# Patient Record
Sex: FEMALE
[De-identification: names, ages, dates, MRNs, and addresses within clinical notes are randomized per-mention and may not be internally consistent; named-entity substitution may affect disease eponyms.]

---

## 2018-11-30 LAB
ANION GAP SERPL CALC-SCNC: 10.9 MMOL/L (ref 8–16)
BASOPHILS # BLD AUTO: 0 10*3/UL (ref 0–0.1)
BASOPHILS NFR BLD AUTO: 0.4 % (ref 0–1)
BUN SERPL-MCNC: 13 MG/DL (ref 7–26)
BUN/CREAT SERPL: 14 (ref 6–25)
CALCIUM SERPL-MCNC: 9 MG/DL (ref 8.4–10.2)
CHLORIDE SERPL-SCNC: 106 MMOL/L (ref 98–107)
CO2 SERPL-SCNC: 27 MMOL/L (ref 22–29)
DEPRECATED NEUTROPHILS # BLD AUTO: 4.6 10*3/UL (ref 2.1–6.9)
EGFRCR SERPLBLD CKD-EPI 2021: > 60 ML/MIN (ref 60–?)
EOSINOPHIL # BLD AUTO: 0.2 10*3/UL (ref 0–0.4)
EOSINOPHIL NFR BLD AUTO: 2.5 % (ref 0–6)
ERYTHROCYTE [DISTWIDTH] IN CORD BLOOD: 12.7 % (ref 11.7–14.4)
GLUCOSE SERPLBLD-MCNC: 115 MG/DL (ref 74–118)
HCT VFR BLD AUTO: 38.4 % (ref 34.2–44.1)
HGB BLD-MCNC: 13 G/DL (ref 12–16)
LYMPHOCYTES # BLD: 3.7 10*3/UL (ref 1–3.2)
LYMPHOCYTES NFR BLD AUTO: 40.2 % (ref 18–39.1)
MCH RBC QN AUTO: 29.4 PG (ref 28–32)
MCHC RBC AUTO-ENTMCNC: 33.9 G/DL (ref 31–35)
MCV RBC AUTO: 86.9 FL (ref 81–99)
MONOCYTES # BLD AUTO: 0.6 10*3/UL (ref 0.2–0.8)
MONOCYTES NFR BLD AUTO: 6.8 % (ref 4.4–11.3)
NEUTS SEG NFR BLD AUTO: 49.7 % (ref 38.7–80)
PLATELET # BLD AUTO: 290 X10E3/UL (ref 140–360)
POTASSIUM SERPL-SCNC: 3.9 MMOL/L (ref 3.5–5.1)
RBC # BLD AUTO: 4.42 X10E6/UL (ref 3.6–5.1)
SODIUM SERPL-SCNC: 140 MMOL/L (ref 136–145)

## 2018-11-30 NOTE — DIAGNOSTIC IMAGING REPORT
EXAMINATION:  CHEST 2 VIEWS    



INDICATION:      

^MD ORDER

^PRE ADMIT    



COMPARISON:  None

     

FINDINGS:  PA and lateral views



TUBES and LINES:  None.



LUNGS:  Lungs are well inflated.  Lungs are clear.   There is no evidence of

pneumonia or pulmonary edema.



PLEURA:  No pleural effusion or pneumothorax.



HEART AND MEDIASTINUM:  The cardiomediastinal silhouette is unremarkable.



BONES AND SOFT TISSUES:  No acute osseous lesion.  Soft tissues are

unremarkable.



UPPER ABDOMEN: No free air under the diaphragm. Right upper quadrant

cholecystectomy clips.



IMPRESSION: 

No acute thoracic abnormality.





Signed by: Dr. Linda Torres M.D. on 11/30/2018 4:21 PM

## 2018-12-04 ENCOUNTER — HOSPITAL ENCOUNTER (OUTPATIENT)
Dept: HOSPITAL 88 - OR | Age: 58
Discharge: HOME | End: 2018-12-04
Attending: PODIATRIST
Payer: COMMERCIAL

## 2018-12-04 VITALS — SYSTOLIC BLOOD PRESSURE: 125 MMHG | DIASTOLIC BLOOD PRESSURE: 64 MMHG

## 2018-12-04 DIAGNOSIS — Z01.810: ICD-10-CM

## 2018-12-04 DIAGNOSIS — M21.622: ICD-10-CM

## 2018-12-04 DIAGNOSIS — M54.2: ICD-10-CM

## 2018-12-04 DIAGNOSIS — M20.42: ICD-10-CM

## 2018-12-04 DIAGNOSIS — Z01.812: ICD-10-CM

## 2018-12-04 DIAGNOSIS — Z01.818: ICD-10-CM

## 2018-12-04 DIAGNOSIS — M20.12: Primary | ICD-10-CM

## 2018-12-04 DIAGNOSIS — M54.9: ICD-10-CM

## 2018-12-04 PROCEDURE — 28285 REPAIR OF HAMMERTOE: CPT

## 2018-12-04 PROCEDURE — 85025 COMPLETE CBC W/AUTO DIFF WBC: CPT

## 2018-12-04 PROCEDURE — 73620 X-RAY EXAM OF FOOT: CPT

## 2018-12-04 PROCEDURE — 80048 BASIC METABOLIC PNL TOTAL CA: CPT

## 2018-12-04 PROCEDURE — 71046 X-RAY EXAM CHEST 2 VIEWS: CPT

## 2018-12-04 PROCEDURE — 28110 PART REMOVAL OF METATARSAL: CPT

## 2018-12-04 PROCEDURE — 28296 COR HLX VLGS DSTL MTAR OSTEO: CPT

## 2018-12-04 PROCEDURE — 93005 ELECTROCARDIOGRAM TRACING: CPT

## 2018-12-04 PROCEDURE — 36415 COLL VENOUS BLD VENIPUNCTURE: CPT

## 2018-12-04 NOTE — XMS REPORT
Clinical Summary

                             Created on: 2018



Kimberly Busby

External Reference #: [id number]

: 1960

Sex: Female



Demographics







                          Address                   [address]

[place]  [address]

 

                          Home Phone                [phone]

 

                          Preferred Language        Unknown

 

                          Marital Status            

 

                          Mandaen Affiliation     Unknown

 

                          Race                      White

 

                          Ethnic Group              /Latin





Author







                          Author                    [facility]

 

                          Organization              [facility]

 

                          Address                   Unknown

 

                          Phone                     Unavailable







Support







                Name            Relationship    Address         Phone

 

                    Nina Busby      [employer]                [address]

[place]  [address]                      [phone]







Care Team Providers







                    Care Team Member Name    Role                Phone

 

                    Asked, No Pcp       PCP                 Unavailable







Allergies

No Known Allergies



Medications







                          End Date                  Status



              Medication     Sig          Dispensed     Refills      Start  



                                         Date  

 

                                                    Active



                 ergocalciferol (VITAMIN     Take 50,000      0                 



                     D2) 50,000 unit capsule     Units by            8  



                                         mouth once a     



                                         week.     

 

                                                    Active



                 pantoprazole (PROTONIX)     TK 1 T PO QD      0                 



                           40 MG EC tablet           8  

 

                          2018                



              cyclobenzaprine     Take 1 tablet     20 tablet     0              



                     (FLEXERIL) 10 mg tablet     (10 mg total)       8  



                                         by mouth 2     



                                         (two) times a     



                                         day as needed     



                                         for muscle     



                                         spasms for up     



                                         to 30 days.     

 

                          2018                



              acetaminophen-codeine     Take 1-2     15 tablet     0              



                     (TYLENOL WITH CODEINE #3)     tablets by          8  



                           300-30 mg per tablet      mouth every 6     



                                         (six) hours     



                                         as needed for     



                                         moderate pain     



                                         for up to 5     



                                         days.     

 

                          2018                



              lidocaine (LIDODERM) 5 %     Place 1 patch     30 patch     0              



                           on the skin               8  



                                         daily for 30     



                                         days. Remove     



                                         & Discard     



                                         patch within     



                                         12 hours or     



                                         as directed     



                                         by MD     







Active Problems





Not on file



Encounters







                          Care Team                 Description



                     Date                Type                Specialty  

 

                                        



Mat Nelson MD           Musculoskeletal neck pain (Primary Dx)



                     2018          Emergency           Emergency Medicine  



after 2017



Social History







                                        Date



                 Tobacco Use     Types           Packs/Day       Years Used 

 

                                         



                                         Never Smoker    

 

    



                                         Smokeless Tobacco: Never   



                                         Used   









   



                 Alcohol Use     Drinks/Week     oz/Week         Comments

 

   



                                         No   









 



                           Sex Assigned at Birth     Date Recorded

 

 



                                         Not on file 









                                        Industry



                           Job Start Date            Occupation 

 

                                        Not on file



                           Not on file               Not on file 









                                        Travel End



                           Travel History            Travel Start 

 





                                         No recent travel history available.







Last Filed Vital Signs







                                        Time Taken



                           Vital Sign                Reading 

 

                                        2018 12:58 PM CDT



                           Blood Pressure            134/60 

 

                                        2018 12:58 PM CDT



                           Pulse                     60 

 

                                        2018 12:58 PM CDT



                           Temperature               36.7 C (98 F) 

 

                                        2018 12:58 PM CDT



                           Respiratory Rate          18 

 

                                        2018 12:58 PM CDT



                           Oxygen Saturation         98% 

 

                                        -



                           Inhaled Oxygen            - 



                                         Concentration  

 

                                        -



                           Weight                    - 

 

                                        2018 12:05 PM CDT



                           Height                    154.9 cm (5' 1") 

 

                                        -



                           Body Mass Index           - 







Plan of Treatment







   



                 Health Maintenance     Due Date        Last Done       Comments

 

   



                           CERVICAL CANCER SCREENING     1979  

 

   



                           BREAST CANCER SCREENING     2008  

 

   



                           COLON CANCER SCREENING     2008  

 

   



                           SHINGRIX VACCINE (1 of 2)     2008  

 

   



                           ZOSTER VACCINE            2018  

 

   



                           INFLUENZA VACCINE         2018  

 

   



                     HEPATITIS B VACCINES     Aged Out            No longer eligible based



                                         on patient's age to



                                         complete this topic

 

   



                     IPV VACCINES        Aged Out            No longer eligible based



                                         on patient's age to



                                         complete this topic

 

   



                     MENINGOCOCCAL VACCINE     Aged Out            No longer eligible based



                                         on patient's age to



                                         complete this topic







Results

Not on fileafter 2017



Insurance







     



            Payer      Benefit     Subscriber ID     Type       Phone      Address



                                         Plan /    



                                         Group    

 

     



                 BCBS            BCBS            xxxxxxxxxxxx     PPO  



                                         CHOICE    



                                         PPO/SIRISHA CALLE PPO    









     



            Guarantor Name     Account     Relation to     Date of     Phone      Billing Address



                     Type                Patient             Birth  

 

     



            Kimberly Busby     Personal/F     Self       1960     [phone] [address]               ([address])              [place] [address]







Advance Directives





Patient has advance care planning documents on file. For more information, kylah florez contact:



Dean Doherty



[address] [place] [address]

## 2018-12-04 NOTE — DIAGNOSTIC IMAGING REPORT
PROCEDURE:X-RAY LEFT FOOT, TWO VIEWS

 

COMPARISON:None.

 

INDICATIONS:S/P HALLUX FX REPAIR

 

FINDINGS:

Substantially limited radiograph secondary to overlying cast and 

portable technique. Cannot assess for bony alignment and presence of 

non-displaced fracture secondary to technique. 

 

A K wire is seen transfixing the fourth toe proximal, middle, and 

distal phalanges. The phalanges are not well evaluated. There has been 

fixation of a mildly displaced first metatarsal head fracture with 

screw and wire. Hardware appears intact. 

 

CONCLUSION:

Limited examination demonstrating post surgical changes status post 

fixation of the fourth toe and first metatarsal head. 

 

 

 

Dictated by:  ELI JIMENEZ M.D. on 12/04/2018 at 9:59     

Electronically approved by:  ELI JIMENEZ M.D. on 12/04/2018 at 9:59

## 2018-12-04 NOTE — OPERATIVE REPORT
DATE OF PROCEDURE:  December 04, 2018 

 

PREOPERATIVE DIAGNOSES 

1.  Painful hallux valgus deformity, left foot.  

2.  Painful contracted hammer toe, 4th digit, left foot.  

3.  Painful contracted hammer toe, 5th digit, left foot. 

4.  Painful Tailor's bunion, left foot.  



POSTOPERATIVE DIAGNOSES 

1.  Painful hallux valgus deformity, left foot.  

2.  Painful contracted hammer toe, 4th digit, left foot.  

3.  Painful contracted hammer toe, 5th digit, left foot. 

4.  Painful Tailor's bunion, left foot.  



OPERATIVE PROCEDURES 

1.  Vladimir bunionectomy with screw fixation, left foot.  

2.  Arthroplasty, 4th digit with Amol wire fixation of the 4th.

3.  Arthroplasty, 5th digit with Amol wire fixation of the 5th.  

4.  Tailor's bunionectomy, left foot.

5.  Intraoperative use of fluoroscopy.

6.  Trigger point shot of cortisone.

7.  Application of posterior splint.  



ANESTHESIA:  General.



HEMOSTASIS:  Pneumatic thigh tourniquet at 350 mmHg.



PROCEDURE IN DETAIL:  Patient was taken into the operating room and placed 

on the operating room table in the supine position.  Following induction of 

general anesthesia by the anesthesiologist, Webril wraps were placed on the 

patient's left thigh followed by application of a left thigh tourniquet.  

The left lower extremity was then prepped and draped in the usual aseptic 

manner.  The following procedure was then performed.  



PROCEDURE #1:  Vladimir bunionectomy with screw fixation, left foot.  

Attention was directed to the dorsomedial aspect of the 1st MPJ where a 6 

cm linear incision was performed.  The incision was deepened via sharp and 

blunt dissection being care to retract vital structures and ligate 

superficial vessels as necessary.  Once the level of capsule was reached, a 

longitudinal capsulotomy was then performed exposing a dorsomedial 

exostosis of the 1st metatarsal head.  Via the use of an oscillating saw, 

dorsomedial exostosis was excised from the operation site in toto.  A 

V-osteotomy was then performed from medial to lateral.  Capital fragment 

was then transpositioned laterally upon adequate surgical and anatomical 

reduction.  Utilizing proper AO technique, a 2.0 x 18 mm cortical screw in 

conjunction with a buried 0.045 K-wire was used to achieve stability at the 

osteotomy site.  All redundant bone medially was excised via the use of an 

oscillating saw and rotating bur.  



PROCEDURES #2 AND #3:  Arthroplasty, 4th and 5th digits with K-wire 

fixation of 4th.  Attention was then directed to the above-mentioned toes 

overlying the proximal phalangeal joint where a 3 cm linear incision was 

performed.  Incision was deepened down to the joint capsule.  Transverse 

capsulotomy was then performed exposing the head of the proximal phalanxes. 

 Via the use of an oscillating saw, the proximal phalanxes were excised 

from the operation site in toto. All rough and bony edges were rasped 

smooth.  The 4th toe was still noted be contracted, so a 0.045 K-wire was 

introduced up to the metatarsophalangeal joint to achieve proper anatomical 

reduction.  



PROCEDURE #4:  Tailor's bunionectomy, left foot.  Attention was directed to 

the dorsal lateral aspect of the 5th metatarsophalangeal joint where a 3-4 

cm linear incision was performed.  Incision was deepened down to the joint 

capsule.  Longitudinal capsulotomy was then performed exposing the 

dorsolateral exostosis of the 5th metatarsal head.  Via the use of an 

oscillating saw and rotating bur, dorsolateral exostosis was excised from 

the operation site in toto, and all rough and bony edges were rasped 

smooth.  



PROCEDURE #5:  Intraoperative use of fluoroscopy was then used to make sure 

proper alignment and fixation was achieved.  Closure was then obtained 

utilizing 3-0 Vicryl, 4-0 Vicryl and 4-0 nylon for capsule, subcutaneous 

tissue and skin respectively.  



PROCEDURE #6:  Trigger point shot of cortisone was then given to the 1st 

and 4th interspace of the left foot.  Then approximately 15 mL of 0.5% 

plain Marcaine plus 5 mL of 1% Xylocaine plain were used to achieve local 

anesthesia to the above-mentioned surgical area.  Sterile dressing was 

applied.  Upon release of the thigh tourniquet, blood hyperemia was noted 

immediate to all digits of the patient's left foot.  



PROCEDURE #7:  Application of posterior splint.  A properly placed 

posterior splint was then applied keeping the foot at 90 degrees with 

respect to the leg to try to prevent any type of postop complications.  

Patient was then transferred from the OR to the recovery room with vital 

signs stable and neurovascular status intact.  No intraoperative 

complications were encountered.  Blood loss from the surgery was minimal.  

Patient to remain nonweightbearing with the aid of crutches.  Keep her foot 

elevated.  Is to apply an ice pack to the ankle joint area.  









DD:  12/04/2018 08:42

DT:  12/04/2018 09:29

Job#:  [id number]

## 2018-12-04 NOTE — XMS REPORT
Patient Summary Document

                             Created on: 2018



JOLLY JOHNS

External Reference #: [id number]

: 1960

Sex: Female



Demographics







                          Address                   [address] [place] [address]

 

                          Home Phone                [phone]

 

                          Preferred Language        Unknown

 

                          Marital Status            Unknown

 

                          Religion Affiliation     Unknown

 

                          Race                      Unknown

 

                                        Additional Race(s) 

 

 

                          Ethnic Group              Unknown





Author







                          Author                    [facility]

 

                          Address                   Unknown

 

                          Phone                     Unavailable







Care Team Providers







                    Care Team Member Name    Role                Phone

 

                    KENRICK MELTON        Unavailable         Unavailable

 

                    JOE JEROME     Unavailable         Unavailable







Problems

This patient has no known problems.



Allergies, Adverse Reactions, Alerts

This patient has no known allergies or adverse reactions.



Medications

This patient has no known medications.



Encounters







             Start Date/Time    End Date/Time    Encounter Type    Admission Type    Attending Clinicians

                    Care Facility       Care Department     Encounter ID

 

          2017-08-15 16:12:00    2017-08-15 23:59:00    Outpatient    C         JOE JEROME    [facility]                               [id number]







Results







           Test Description    Test Time    Test Comments    Text Results    Atomic Results    Result

 Comments

 

                CHEST 2 VIEWS    2018 16:17:00                                                             

                                             [place] [address]  
   Patient Name: JOLLY JOHNS                                   MR #: [id number]
                    : 1960                                   Age/Sex: 
58/F  Acct #: [id number]                              Req #: [id number]  Adm 
Physician:                                                      Ordered by: 
KENRICK MELTON DP                            Report #: [id number]        Location: 
OR                                      Room/Bed:                     
___________________________________________________________________________________________________
   Procedure: [id number] DX/CHEST 2 VIEWS  Exam Date:                            
Exam Time:                                               REPORT STATUS: Signed  
 EXAMINATION:  CHEST 2 VIEWS          INDICATION:          MD ORDER    PRE ADMIT
         COMPARISON:  None           FINDINGS:  PA and lateral views      TUBES 
and LINES:  None.      LUNGS:  Lungs are well inflated.  Lungs are clear.   
There is no evidence of   pneumonia or pulmonary edema.      PLEURA:  No pleural
effusion or pneumothorax.      HEART AND MEDIASTINUM:  The cardiomediastinal 
silhouette is unremarkable.      BONES AND SOFT TISSUES:  No acute osseous 
lesion.  Soft tissues are   unremarkable.      UPPER ABDOMEN: No free air under 
the diaphragm. Right upper quadrant   cholecystectomy clips.      IMPRESSION:   
No acute thoracic abnormality.         Signed by: Dr. Herlinda Morrissey M.D. on 2018 4:21 PM        Dictated By: HERLINDA MORRISSEY MD  
Electronically Signed By: HERLINDA MORRISSEY MD on 18 1621  
Transcribed By: NABILA on 18 1621       COPY TO:   KENRICK MELTON DPM

## 2019-02-22 ENCOUNTER — HOSPITAL ENCOUNTER (OUTPATIENT)
Dept: HOSPITAL 88 - ER | Age: 59
Setting detail: OBSERVATION
LOS: 1 days | Discharge: HOME | End: 2019-02-23
Attending: INTERNAL MEDICINE | Admitting: INTERNAL MEDICINE
Payer: COMMERCIAL

## 2019-02-22 VITALS — WEIGHT: 154.19 LBS | BODY MASS INDEX: 29.11 KG/M2 | HEIGHT: 61 IN

## 2019-02-22 VITALS — SYSTOLIC BLOOD PRESSURE: 152 MMHG | DIASTOLIC BLOOD PRESSURE: 68 MMHG

## 2019-02-22 VITALS — DIASTOLIC BLOOD PRESSURE: 68 MMHG | SYSTOLIC BLOOD PRESSURE: 152 MMHG

## 2019-02-22 DIAGNOSIS — K92.1: Primary | ICD-10-CM

## 2019-02-22 DIAGNOSIS — E11.9: ICD-10-CM

## 2019-02-22 LAB
ALBUMIN SERPL-MCNC: 4 G/DL (ref 3.5–5)
ALBUMIN/GLOB SERPL: 1.2 {RATIO} (ref 0.8–2)
ALP SERPL-CCNC: 136 IU/L (ref 40–150)
ALT SERPL-CCNC: 65 IU/L (ref 0–55)
ANION GAP SERPL CALC-SCNC: 14.7 MMOL/L (ref 8–16)
BACTERIA URNS QL MICRO: (no result) /HPF
BASOPHILS # BLD AUTO: 0 10*3/UL (ref 0–0.1)
BASOPHILS NFR BLD AUTO: 0.5 % (ref 0–1)
BILIRUB UR QL: NEGATIVE
BUN SERPL-MCNC: 9 MG/DL (ref 7–26)
BUN/CREAT SERPL: 12 (ref 6–25)
CALCIUM SERPL-MCNC: 9.5 MG/DL (ref 8.4–10.2)
CHLORIDE SERPL-SCNC: 106 MMOL/L (ref 98–107)
CLARITY UR: (no result)
CO2 SERPL-SCNC: 22 MMOL/L (ref 22–29)
COLOR UR: YELLOW
DEPRECATED INR PLAS: 0.88
DEPRECATED NEUTROPHILS # BLD AUTO: 4.5 10*3/UL (ref 2.1–6.9)
DEPRECATED RBC URNS MANUAL-ACNC: (no result) /HPF (ref 0–5)
EGFRCR SERPLBLD CKD-EPI 2021: > 60 ML/MIN (ref 60–?)
EOSINOPHIL # BLD AUTO: 0.1 10*3/UL (ref 0–0.4)
EOSINOPHIL NFR BLD AUTO: 1.2 % (ref 0–6)
EPI CELLS URNS QL MICRO: (no result) /LPF
ERYTHROCYTE [DISTWIDTH] IN CORD BLOOD: 13.2 % (ref 11.7–14.4)
GLOBULIN PLAS-MCNC: 3.3 G/DL (ref 2.3–3.5)
GLUCOSE SERPLBLD-MCNC: 123 MG/DL (ref 74–118)
HCT VFR BLD AUTO: 39.6 % (ref 34.2–44.1)
HGB BLD-MCNC: 13.3 G/DL (ref 12–16)
KETONES UR QL STRIP.AUTO: (no result)
LEUKOCYTE ESTERASE UR QL STRIP.AUTO: (no result)
LYMPHOCYTES # BLD: 3.1 10*3/UL (ref 1–3.2)
LYMPHOCYTES NFR BLD AUTO: 36.9 % (ref 18–39.1)
MCH RBC QN AUTO: 28.7 PG (ref 28–32)
MCHC RBC AUTO-ENTMCNC: 33.6 G/DL (ref 31–35)
MCV RBC AUTO: 85.3 FL (ref 81–99)
MONOCYTES # BLD AUTO: 0.7 10*3/UL (ref 0.2–0.8)
MONOCYTES NFR BLD AUTO: 7.9 % (ref 4.4–11.3)
MUCOUS THREADS URNS QL MICRO: (no result)
NEUTS SEG NFR BLD AUTO: 53 % (ref 38.7–80)
NITRITE UR QL STRIP.AUTO: NEGATIVE
PLATELET # BLD AUTO: 354 X10E3/UL (ref 140–360)
POTASSIUM SERPL-SCNC: 3.7 MMOL/L (ref 3.5–5.1)
PROT UR QL STRIP.AUTO: NEGATIVE
PROTHROMBIN TIME: 12.8 SECONDS (ref 11.9–14.5)
RBC # BLD AUTO: 4.64 X10E6/UL (ref 3.6–5.1)
SODIUM SERPL-SCNC: 139 MMOL/L (ref 136–145)
SP GR UR STRIP: 1.03 (ref 1.01–1.02)
UROBILINOGEN UR STRIP-MCNC: 0.2 MG/DL (ref 0.2–1)
WBC #/AREA URNS HPF: (no result) /HPF (ref 0–5)

## 2019-02-22 PROCEDURE — 36415 COLL VENOUS BLD VENIPUNCTURE: CPT

## 2019-02-22 PROCEDURE — 99284 EMERGENCY DEPT VISIT MOD MDM: CPT

## 2019-02-22 PROCEDURE — 85610 PROTHROMBIN TIME: CPT

## 2019-02-22 PROCEDURE — 82948 REAGENT STRIP/BLOOD GLUCOSE: CPT

## 2019-02-22 PROCEDURE — 90732 PPSV23 VACC 2 YRS+ SUBQ/IM: CPT

## 2019-02-22 PROCEDURE — 80048 BASIC METABOLIC PNL TOTAL CA: CPT

## 2019-02-22 PROCEDURE — 86900 BLOOD TYPING SEROLOGIC ABO: CPT

## 2019-02-22 PROCEDURE — 81001 URINALYSIS AUTO W/SCOPE: CPT

## 2019-02-22 PROCEDURE — 80053 COMPREHEN METABOLIC PANEL: CPT

## 2019-02-22 PROCEDURE — 87086 URINE CULTURE/COLONY COUNT: CPT

## 2019-02-22 PROCEDURE — 86850 RBC ANTIBODY SCREEN: CPT

## 2019-02-22 PROCEDURE — 85025 COMPLETE CBC W/AUTO DIFF WBC: CPT

## 2019-02-22 NOTE — XMS REPORT
Summary of Care: 10/6/16 - 10/6/16

                             Created on: 2030



JOLLY JOHNS

External Reference #: [id number]

: 1960

Sex: Female



Demographics







                          Address                   [address]

[place]  [address]-

 

                          Home Phone                [phone]

 

                          Preferred Language        Unknown

 

                          Marital Status            

 

                          Restorationist Affiliation     Lutheran

 

                          Race                      Other

 

                          Ethnic Group              /Latin





Author







                          Author                    [facility]

 

                          Address                   Unknown

 

                          Phone                     Unavailable







Encounter





HQ Encntr_zara(FIN) [id number] Date(s): 10/6/16 - 10/6/16

[facility] [address] [place] [address]-     
[phone]

Discharge Disposition: Home or Self Care

Attending Physician: Abdoul Isbell MD





Vital Signs





No data available for this section



Problem List





No data available for this section



Allergies, Adverse Reactions, Alerts







   



                 Substance       Reaction        Severity        Status

 

   



                           NKDA                      Active







Medications





No data available for this section



Results





No data available for this section



Immunizations





Given and Recorded





   



                 Vaccine         Date            Status          Refusal Reason

 

   



                     tetanus-diphtheria toxoids     11             Given 







Procedures





No data available for this section



Social History





No data available for this section



Assessment and Plan





No data available for this section

## 2019-02-22 NOTE — XMS REPORT
Summary of Care: 3/17/16 - 3/17/16

                             Created on: 2058



JOLLY JOHNS

External Reference #: [id number]

: 1960

Sex: Female



Demographics







                          Address                   [address]

[place]  [address]-

 

                          Home Phone                [phone]

 

                          Preferred Language        English

 

                          Marital Status            

 

                          Jainism Affiliation     Adventist

 

                          Race                      Other

 

                          Ethnic Group              /Latin





Author







                          Author                    [facility]

 

                          Organization              [facility]

 

                          Address                   Unknown

 

                          Phone                     Unavailable







Encounter





CALVIN Napoles(DEVANG) [id number] Date(s): 3/17/16 - 3/17/16

[facility] [address] [place] [address]-     [phone]
[phone]

Discharge Disposition: Home

Attending Physician: Abdoul Isbell MD

Referring Physician: Abdoul Isbell MD





Vital Signs





No data available for this section



Problem List





No data available for this section



Allergies, Adverse Reactions, Alerts







   



                 Substance       Reaction        Severity        Status

 

   



                           NKDA                      Active







Medications





No data available for this section



Results





CHEM PANEL





 



                           Most recent to            1



                                         oldest [Reference 



                                         Range]: 

 

 



                           eGFR                      96 mL/min/1.73m2 1



                                         *NA*



                                         (3/17/16 1:07 PM)

 

 



                           POC Creatinine            0.7 mg/dL



                           [0.5-1.4 mg/dL]           (3/17/16 1:07 PM)







1Result Comment: The eGFR is calculated using the CKD-EPI formula. In most 
young, healthy individuals the eGFR will be >90 mL/min/1.73m2. The eGFR declines
with age. An eGFR of 60-89 may be normal in some populations, particularly the 
elderly, for whom the CKD-EPI formula has not been extensively validated. Use of
the eGFR is not recommended in the following populations:



Individuals with unstable creatinine concentrations, including pregnant patients
and those with serious co-morbid conditions.



Patients with extremes in muscle mass or diet. 



The data above are obtained from the National Kidney Disease Education Program (
NKDEP) which additionally recommends that when the eGFR is used in patients with
extremes of body mass index for purposes of drug dosing, the eGFR should be mul
tiplied by the estimated BMI.



Immunizations







  



                     Vaccine             Date                Refusal Reason

 

  



                           tetanus-diphtheria toxoids     11 







Procedures





No data available for this section



Social History





No data available for this section



Assessment and Plan





No data available for this section

## 2019-02-22 NOTE — NUR
RECEIVED PT TO UNIT ROOM 210 VIA STRETCHER.NO S/S OF DISTRESS NOTED.RESPIRATIONS EVEN/NON 
LABORED.DENIES ANY PAIN/DISCOMFORT.ORIENTED PT TO ROOM,BATHROOM,CALL LIGHT AT THIS 
TIME.INSTRUCTED PT TO CALL FOR ASSISTANCE AS NEEDED.PT VERBALIZED UNDERSTANDING.FAMILY AT 
BEDSIDE.CALL LIGHT WITHIN EASY REACH.

## 2019-02-22 NOTE — XMS REPORT
Clinical Summary

                             Created on: 2019



Kimberly Busby

External Reference #: [id number]

: 1960

Sex: Female



Demographics







                          Address                   [address]

[place]  [address]

 

                          Home Phone                [phone]

 

                          Preferred Language        Unknown

 

                          Marital Status            

 

                          Religion Affiliation     Unknown

 

                          Race                      White

 

                          Ethnic Group              /Latin





Author







                          Author                    [facility]

 

                          Organization              [facility]

 

                          Address                   Unknown

 

                          Phone                     Unavailable







Support







                Name            Relationship    Address         Phone

 

                    Nian Busby      [employer]                [address]

[place]  [address]                      [phone]







Care Team Providers







                    Care Team Member Name    Role                Phone

 

                    Asked, No Pcp       PCP                 Unavailable







Allergies

No Known Allergies



Medications







                          End Date                  Status



              Medication     Sig          Dispensed     Refills      Start  



                                         Date  

 

                                                    Active



                 ergocalciferol (VITAMIN     Take 50,000      0                 



                     D2) 50,000 unit capsule     Units by            8  



                                         mouth once a     



                                         week.     

 

                                                    Active



                 pantoprazole (PROTONIX)     TK 1 T PO QD      0                 



                           40 MG EC tablet           8  

 

                          2018                



              cyclobenzaprine     Take 1 tablet     20 tablet     0              



                     (FLEXERIL) 10 mg tablet     (10 mg total)       8  



                                         by mouth 2     



                                         (two) times a     



                                         day as needed     



                                         for muscle     



                                         spasms for up     



                                         to 30 days.     

 

                          2018                



              acetaminophen-codeine     Take 1-2     15 tablet     0              



                     (TYLENOL WITH CODEINE #3)     tablets by          8  



                           300-30 mg per tablet      mouth every 6     



                                         (six) hours     



                                         as needed for     



                                         moderate pain     



                                         for up to 5     



                                         days.     

 

                          2018                



              lidocaine (LIDODERM) 5 %     Place 1 patch     30 patch     0              



                           on the skin               8  



                                         daily for 30     



                                         days. Remove     



                                         & Discard     



                                         patch within     



                                         12 hours or     



                                         as directed     



                                         by MD     







Active Problems





Not on file



Encounters







                          Care Team                 Description



                     Date                Type                Specialty  

 

                                        



Mat Nelson MD           Musculoskeletal neck pain (Primary Dx)



                     2018          Emergency           Emergency Medicine  



after 2018



Social History







                                        Date



                 Tobacco Use     Types           Packs/Day       Years Used 

 

                                         



                                         Never Smoker    

 

    



                                         Smokeless Tobacco: Never   



                                         Used   









   



                 Alcohol Use     Drinks/Week     oz/Week         Comments

 

   



                                         No   









 



                           Sex Assigned at Birth     Date Recorded

 

 



                                         Not on file 









                                        Industry



                           Job Start Date            Occupation 

 

                                        Not on file



                           Not on file               Not on file 









                                        Travel End



                           Travel History            Travel Start 

 





                                         No recent travel history available.







Last Filed Vital Signs







                                        Time Taken



                           Vital Sign                Reading 

 

                                        2018 12:58 PM CDT



                           Blood Pressure            134/60 

 

                                        2018 12:58 PM CDT



                           Pulse                     60 

 

                                        2018 12:58 PM CDT



                           Temperature               36.7 C (98 F) 

 

                                        2018 12:58 PM CDT



                           Respiratory Rate          18 

 

                                        2018 12:58 PM CDT



                           Oxygen Saturation         98% 

 

                                        -



                           Inhaled Oxygen            - 



                                         Concentration  

 

                                        -



                           Weight                    - 

 

                                        2018 12:05 PM CDT



                           Height                    154.9 cm (5' 1") 

 

                                        -



                           Body Mass Index           - 







Plan of Treatment







   



                 Health Maintenance     Due Date        Last Done       Comments

 

   



                           CERVICAL CANCER SCREENING     1979  

 

   



                           BREAST CANCER SCREENING     2008  

 

   



                           COLON CANCER SCREENING     2008  

 

   



                           SHINGLES VACCINES (#1)     2008  

 

   



                           INFLUENZA VACCINE         2018  







Results

Not on fileafter 2018



Insurance







     



            Payer      Benefit     Subscriber ID     Type       Phone      Address



                                         Plan /    



                                         Group    

 

     



                 BCBS            BCBS            xxxxxxxxxxxx     PPO  



                                         CHOICE    



                                         PPO/SIRISHA [employer]LE PPO    









     



            Guarantor Name     Account     Relation to     Date of     Phone      Billing Address



                     Type                Patient             Birth  

 

     



            Kimberly Busby     Personal/F     Self       1960     [phone] [address] [facility]               ([place])              [place] [address]







Advance Directives





Patient has advance care planning documents on file. For more information, kylah florez contact:



[facility]



[address] [place] [address]

## 2019-02-22 NOTE — XMS REPORT
Clinical Summary

                             Created on: 2019



Kimberly Busby

External Reference #: [id number]

: 1960

Sex: Female



Demographics







                          Address                   [address]

[place]  [address]

 

                          Home Phone                [phone]

 

                          Preferred Language        Unknown

 

                          Marital Status            

 

                          Christianity Affiliation     Unknown

 

                          Race                      White

 

                          Ethnic Group              /Latin





Author







                          Author                    [facility]

 

                          Organization              [facility]

 

                          Address                   Unknown

 

                          Phone                     Unavailable







Support







                Name            Relationship    Address         Phone

 

                    Nina Busby      [employer]                [address]

[place]  [address]                      [phone]







Care Team Providers







                    Care Team Member Name    Role                Phone

 

                    Asked, No Pcp       PCP                 Unavailable







Allergies

No Known Allergies



Medications







                          End Date                  Status



              Medication     Sig          Dispensed     Refills      Start  



                                         Date  

 

                                                    Active



                 ergocalciferol (VITAMIN     Take 50,000      0                 



                     D2) 50,000 unit capsule     Units by            8  



                                         mouth once a     



                                         week.     

 

                                                    Active



                 pantoprazole (PROTONIX)     TK 1 T PO QD      0                 



                           40 MG EC tablet           8  

 

                          2018                



              cyclobenzaprine     Take 1 tablet     20 tablet     0              



                     (FLEXERIL) 10 mg tablet     (10 mg total)       8  



                                         by mouth 2     



                                         (two) times a     



                                         day as needed     



                                         for muscle     



                                         spasms for up     



                                         to 30 days.     

 

                          2018                



              acetaminophen-codeine     Take 1-2     15 tablet     0              



                     (TYLENOL WITH CODEINE #3)     tablets by          8  



                           300-30 mg per tablet      mouth every 6     



                                         (six) hours     



                                         as needed for     



                                         moderate pain     



                                         for up to 5     



                                         days.     

 

                          2018                



              lidocaine (LIDODERM) 5 %     Place 1 patch     30 patch     0              



                           on the skin               8  



                                         daily for 30     



                                         days. Remove     



                                         & Discard     



                                         patch within     



                                         12 hours or     



                                         as directed     



                                         by MD     







Active Problems





Not on file



Encounters







                          Care Team                 Description



                     Date                Type                Specialty  

 

                                        



Mat Nelson MD           Musculoskeletal neck pain (Primary Dx)



                     2018          Emergency           Emergency Medicine  



after 2018



Social History







                                        Date



                 Tobacco Use     Types           Packs/Day       Years Used 

 

                                         



                                         Never Smoker    

 

    



                                         Smokeless Tobacco: Never   



                                         Used   









   



                 Alcohol Use     Drinks/Week     oz/Week         Comments

 

   



                                         No   









 



                           Sex Assigned at Birth     Date Recorded

 

 



                                         Not on file 









                                        Industry



                           Job Start Date            Occupation 

 

                                        Not on file



                           Not on file               Not on file 









                                        Travel End



                           Travel History            Travel Start 

 





                                         No recent travel history available.







Last Filed Vital Signs







                                        Time Taken



                           Vital Sign                Reading 

 

                                        2018 12:58 PM CDT



                           Blood Pressure            134/60 

 

                                        2018 12:58 PM CDT



                           Pulse                     60 

 

                                        2018 12:58 PM CDT



                           Temperature               36.7 C (98 F) 

 

                                        2018 12:58 PM CDT



                           Respiratory Rate          18 

 

                                        2018 12:58 PM CDT



                           Oxygen Saturation         98% 

 

                                        -



                           Inhaled Oxygen            - 



                                         Concentration  

 

                                        -



                           Weight                    - 

 

                                        2018 12:05 PM CDT



                           Height                    154.9 cm (5' 1") 

 

                                        -



                           Body Mass Index           - 







Plan of Treatment







   



                 Health Maintenance     Due Date        Last Done       Comments

 

   



                           CERVICAL CANCER SCREENING     1979  

 

   



                           BREAST CANCER SCREENING     2008  

 

   



                           COLON CANCER SCREENING     2008  

 

   



                           SHINGLES VACCINES (#1)     2008  

 

   



                           INFLUENZA VACCINE         2018  







Results

Not on fileafter 2018



Insurance







     



            Payer      Benefit     Subscriber ID     Type       Phone      Address



                                         Plan /    



                                         Group    

 

     



                 BCBS            BCBS            xxxxxxxxxxxx     PPO  



                                         CHOICE    



                                         PPO/[employer]IRISHA CALLE PPO    









     



            Guarantor Name     Account     Relation to     Date of     Phone      Billing Address



                     Type                Patient             Birth  

 

     



            Kimberly Busby     Personal/F     Self       1960     [phone] [address] [facility]               ([place])              [place] [address]







Advance Directives





Patient has advance care planning documents on file. For more information, kylah florez contact:



[facility]



[address] [place] [address]

## 2019-02-23 VITALS — SYSTOLIC BLOOD PRESSURE: 127 MMHG | DIASTOLIC BLOOD PRESSURE: 57 MMHG

## 2019-02-23 VITALS — SYSTOLIC BLOOD PRESSURE: 110 MMHG | DIASTOLIC BLOOD PRESSURE: 53 MMHG

## 2019-02-23 VITALS — DIASTOLIC BLOOD PRESSURE: 57 MMHG | SYSTOLIC BLOOD PRESSURE: 127 MMHG

## 2019-02-23 VITALS — SYSTOLIC BLOOD PRESSURE: 109 MMHG | DIASTOLIC BLOOD PRESSURE: 53 MMHG

## 2019-02-23 LAB
ANION GAP SERPL CALC-SCNC: 12.7 MMOL/L (ref 8–16)
BASOPHILS # BLD AUTO: 0 10*3/UL (ref 0–0.1)
BASOPHILS NFR BLD AUTO: 0.5 % (ref 0–1)
BUN SERPL-MCNC: 7 MG/DL (ref 7–26)
BUN/CREAT SERPL: 10 (ref 6–25)
CALCIUM SERPL-MCNC: 9.2 MG/DL (ref 8.4–10.2)
CHLORIDE SERPL-SCNC: 108 MMOL/L (ref 98–107)
CO2 SERPL-SCNC: 22 MMOL/L (ref 22–29)
DEPRECATED NEUTROPHILS # BLD AUTO: 3.6 10*3/UL (ref 2.1–6.9)
EGFRCR SERPLBLD CKD-EPI 2021: > 60 ML/MIN (ref 60–?)
EOSINOPHIL # BLD AUTO: 0.1 10*3/UL (ref 0–0.4)
EOSINOPHIL NFR BLD AUTO: 1.6 % (ref 0–6)
ERYTHROCYTE [DISTWIDTH] IN CORD BLOOD: 12.8 % (ref 11.7–14.4)
GLUCOSE SERPLBLD-MCNC: 138 MG/DL (ref 74–118)
HCT VFR BLD AUTO: 37.6 % (ref 34.2–44.1)
HGB BLD-MCNC: 12.6 G/DL (ref 12–16)
LYMPHOCYTES # BLD: 3.1 10*3/UL (ref 1–3.2)
LYMPHOCYTES NFR BLD AUTO: 42 % (ref 18–39.1)
MCH RBC QN AUTO: 28.7 PG (ref 28–32)
MCHC RBC AUTO-ENTMCNC: 33.5 G/DL (ref 31–35)
MCV RBC AUTO: 85.6 FL (ref 81–99)
MONOCYTES # BLD AUTO: 0.4 10*3/UL (ref 0.2–0.8)
MONOCYTES NFR BLD AUTO: 5.6 % (ref 4.4–11.3)
NEUTS SEG NFR BLD AUTO: 49.9 % (ref 38.7–80)
PLATELET # BLD AUTO: 308 X10E3/UL (ref 140–360)
POTASSIUM SERPL-SCNC: 3.7 MMOL/L (ref 3.5–5.1)
RBC # BLD AUTO: 4.39 X10E6/UL (ref 3.6–5.1)
SODIUM SERPL-SCNC: 139 MMOL/L (ref 136–145)

## 2019-02-23 NOTE — NUR
SOCIAL WORK INITIAL ASSESSMENT 

 to bedside to discuss plan of care with patient/family. CM/SW role and care 
transitions discussed. Anticipated discharge plan discussed along with duration of care. 
CM/SW discussed patients right to make decisions in care.  CM/SW work hours given.  

Patient lives: HOUSE WITH FAMILY

Admit/Transfer: VIA ED

POA/Emergency contact: [address]LEY [address] [phone]

Current/Previous Home Health: NONE

PCP/Follow-up Care: MILADY

Current/Previous DME:  NONE

Other Services: NONE

Employment Status: [employer] 

Areas of Concerns: NONE

Referral Needs: NONE

Education Needs: NONE

IMM/MOON given and signed (if applicable): NA

Goal for discharge: RETURN HOME

CM/SW left business card at the bedside with contact information. Name and number was also 
written on the patients whiteboard. Patient verbalized understanding of discussion. CM will 
follow-up with ongoing discharge and transition of care needs.

## 2019-02-23 NOTE — NUR
PT RESTING IN BED WITH NO S/S OF DISTRESS.RESPIRATIONS EVEN/NON LABORED.DENIES ANY 
PAIN/DISCOMFORT.NO RECTAL BLEEDING REPORTED/NOTED SINCE PT ARRIVAL TO FLOOR.CALL LIGHT 
WITHIN EASY REACH.DAUGHTER AT BEDSIDE.CALL LIGHT WITHIN EASY REACH.

## 2019-02-24 NOTE — DISCHARGE SUMMARY
ADMISSION DIAGNOSES:  Bright red blood per rectum, type 2 diabetes.

 

DISCHARGE DIAGNOSES:  Bright red blood per rectum, type 2 diabetes.

 

MEDICAL HISTORY:  The patient has a history of type 2 diabetes, which is now diet

controlled. 

 

SURGICAL HISTORY:  The patient had a left foot bunionectomy, left foot 4th and 5th toe

arthroplasty, cholecystectomy, tubal ligation, and a brain tumor removal. 

 

FAMILY HISTORY:  The patient's mom has diabetes.

 

SOCIAL HISTORY:  The patient admits to occasional alcohol use.  She denies tobacco and

illicit drug use. 

 

HOSPITAL COURSE:  A 58-year-old female had a colonoscopy on 02/22/2019 with Dr. Hayes.

Once she got home, she noticed bright red blood per rectum that turned her toilet water

red.  She also noticed black stools at that time.  She denies nausea, vomiting,

diarrhea, constipation, and dizziness.  On admission, the patient's hemoglobin was 13.3.

 The following day, her hemoglobin was 12.6.  GI has no plan for any further workup and

she was discharged home.  She will follow up with primary care in 1-2 weeks and GI as

discussed.  Vital signs 

stable.  The patient afebrile.  The patient and family understand discharge instructions

and agree to plan. 

 

 

Dictated by Kat Montes NP

 

______________________________

MD RAGHU Lala/ADRIEL

D:  02/23/2019 19:08:53

T:  02/24/2019 02:22:48

Job #:  [id number]

## 2019-03-31 NOTE — XMS REPORT
Continuity of Care Document

                             Created on: 10/06/2016



JOLLY JOHNS

External Reference #: [id number]

: 1960

Sex: Female



Demographics







                          Address                   [address]

[place] [address]

 

                          Home Phone                [phone]

 

                          Preferred Language        Unknown

 

                          Marital Status            Unknown

 

                          Alevism Affiliation     Unknown

 

                          Race                      Unknown

 

                          Ethnic Group              Unknown





Author







                          Author                    [facility]              Interface

 

                          Address                   Unknown

 

                          Phone                     Unavailable



                                                    



Problems

                    





                    Problem                            Status                            Onset Date     

                          Classification                            Date Reported       

                          Comments                            Source                    

 

                    Q61.00                            Active                            2016      

                                                                                       

                                        CE Clark                    



                                                                                
       



Medications

                    





                    Medication                            Details                            Route      

                          Status                            Patient Instructions         

                          Ordering Provider                            Order Date           

                                        Source                    



                                                                        



Allergies, Adverse Reactions, Alerts

                    





                    Substance                            Category                            Reaction   

                          Severity                            Reaction type           

                          Status                            Date Reported                     

                          Comments                            Source                    



                                                                



Immunizations

                    





                    Immunization                            Date Given                            Site  

                          Status                            Last Updated             

                          Comments                            Source                    

 

                          tetanus-diphtheria toxoids                            2011                  

                    Left Deltoid                            completed                            

Ayala                                                        CE Clark,CE Orellana

                    



                                                                                
       



Results

                    





                    Order Name                            Results                            Value      

                          Reference Range                            Date                

                          Interpretation                            Comments                       

                                        Source                    

 

                          Retroperitoneal Complete US                            Retroperitoneal Complete US

                                        EXAM: US RETROPERITONEAL COMPLETE



DATE: 10/6/2016 10:31 AM CDT

 

INDICATION: N28.1   Cyst of kidney, [id number]   Other microscopic hematuria

 

ADDITIONAL INFORMATION: None.



COMPARISON: CT abdomen pelvis 2016

   

TECHNIQUE: Multiplanar grayscale and color Doppler ultrasound of the kidneys  
and urinary bladder.



FINDINGS:



Right kidney:

Hydronephrosis: None.

Size: 10.3 x 4.5 x 5.2 cm.

Echogenicity: Normal.

Calculi: None.

Cysts: None.

Masses: None.



Left kidney:

Hydronephrosis: None.

Size: 11.0 x 4.7 x 5.5 cm.

Echogenicity: Normal.

Calculi: None.

Cysts: None.

Masses: None.





Bladder: Normal.



IMPRESSION: 

Normal retroperitoneal ultrasound.  No renal cysts are seen on the current 
study.



                                                          10/06/2016                 

                                                      -

                                        -





Read by:  Rafita Oliveira MD

Dictated Date/time:  10/07/16 10:34

Electronically Signed by:  Rafita Oliveira MD                  10/07/16 
10:37

FINAL REPORT

                                        CE Orellana                    

 

                    CHEM PANEL                            eGFR                            96 mL/min/1.73m2

                                                         2016                  

                                                      Result Comment: The eGFR is calculated using the

 CKD-EPI formula. In most young, healthy individuals the eGFR will be >90 
mL/min/1.73m2. The eGFR declines with age. An eGFR of 60-89 may be normal in 
some populations, particularly the elderly, for whom the CKD-EPI formula has not
been extensively validated. Use of the eGFR is not recommended in the following 
populations:



Individuals with unstable creatinine concentrations, including pregnant patients
and those with serious co-morbid conditions.



Patients with extremes in muscle mass or diet. 



The data above are obtained from the National Kidney Disease Education Program 
(NKDEP) which additionally recommends that when the eGFR is used in patients 
with extremes of body mass index for purposes of drug dosing, the eGFR should be
multiplied by the estimated BMI.                            [facility]           

         

 

                    CHEM PANEL                            POC Creatinine                            0.7 

mg/dL                             0.5 - 1.4                            2016    

                                                                                [facility]

                    

 

                          Abdomen/Pelvis w/wo IV contrast CT                            Abdomen/Pelvis w/wo 

IV contrast CT                            Study: Abdomen/Pelvis w/wo IV contrast CT

  

Age: 55 years  y/o Female



Clinical Indication: right renal cyst;    

Comparison: None



TECHNIQUE: Helical imaging was performed before and after contrast, diaphragm 
through the symphysis with multiplanar reformations obtained.  IV CONTRAST: 
100cc Omnipaque 300  GI CONTRAST: Yes

CT Radiation Dose:  HYB=7939 mGy-cm



FINDINGS: 

LOWER CHEST: The lung bases are clear. 



SOLID ORGANS:

 PRECONTRAST: Precontrast images reveal no evidence of hydronephrosis or 
nephrolithiasis. No unusual calcifications noted in the liver or spleen. The 
patient has had previous cholecystectomy. No unusual calcifications noted in the
bowel or pelvis.

 POST CONTRAST: The liver, gallbladder, spleen, pancreas, adrenal glands and 
kidneys are normal. Postcontrast delays show normal opacification of the renal 
collecting systems and ureters.



No significant cyst formation is identified involving the right kidney.





BOWEL: The bowel is within normal limits. The appendix is partially visualized 
and is grossly normal.





PERITONEUM: No free intraperitoneal fluid or air.





RETROPERITONEUM: No adenopathy. The aorta is normal.





PELVIS: No pelvic mass. The urinary bladder is minimally distended. The uterus 
is an mildly anteverted position in the mid pelvis. There is no evidence of 
ovarian enlargement.



MUSCULOSKELETAL: The skeleton is intact.





IMPRESSION: 

                                        1. Previous cholecystectomy.

                                        2. Otherwise unremarkable CT abdomen.





SL:  BABY



                                                          2016                 

                                                      -

                                        -





Read by:  Chun Main MD

Dictated Date/time:  16 22:55

Electronically Signed by:  Chun Main MD                    16 
23:00

FINAL REPORT

                                        [facility]                    



                                                                                
                                                               



Vital Signs

                    





                    Vital Sign                            Value                            Date         

                          Comments                            Source                    



                                                                        



Encounters

                    





                    Location                            Location Details                            Encounter

 Type                            Encounter Number                            Reason For

 Visit                            Attending Provider                            ADM Date

                            DC Date                            Status                

                                        Source                    

 

                          [facility]                                               

                    Outpatient                            [id number]                            

                            Abdoul Isbell                             2016                                                        

[facility]                            [id number]               

                                                Abdoul Isbell                             10/06/2016

                            10/07/2016                                               

                                        CE Orellana                    



                                                                                
               



Procedures

                    





                    Procedure                            Code                            Date           

                          Perfomer                            Comments                        

                                        Source Bipolar disorder    Eczema    Joint symptoms    Seizure  X1 2006